# Patient Record
Sex: MALE | Race: BLACK OR AFRICAN AMERICAN | NOT HISPANIC OR LATINO
[De-identification: names, ages, dates, MRNs, and addresses within clinical notes are randomized per-mention and may not be internally consistent; named-entity substitution may affect disease eponyms.]

---

## 2022-04-02 ENCOUNTER — TRANSCRIPTION ENCOUNTER (OUTPATIENT)
Age: 37
End: 2022-04-02

## 2022-04-03 ENCOUNTER — INPATIENT (INPATIENT)
Facility: HOSPITAL | Age: 37
LOS: 0 days | Discharge: HOME | DRG: 909 | End: 2022-04-03
Attending: UROLOGY | Admitting: UROLOGY
Payer: COMMERCIAL

## 2022-04-03 ENCOUNTER — TRANSCRIPTION ENCOUNTER (OUTPATIENT)
Age: 37
End: 2022-04-03

## 2022-04-03 VITALS
OXYGEN SATURATION: 100 % | DIASTOLIC BLOOD PRESSURE: 110 MMHG | HEART RATE: 88 BPM | TEMPERATURE: 98 F | RESPIRATION RATE: 20 BRPM | HEIGHT: 69 IN | SYSTOLIC BLOOD PRESSURE: 156 MMHG | WEIGHT: 158.07 LBS

## 2022-04-03 VITALS
OXYGEN SATURATION: 100 % | DIASTOLIC BLOOD PRESSURE: 87 MMHG | SYSTOLIC BLOOD PRESSURE: 127 MMHG | RESPIRATION RATE: 14 BRPM | HEART RATE: 74 BPM

## 2022-04-03 DIAGNOSIS — S39.840A FRACTURE OF CORPUS CAVERNOSUM PENIS, INITIAL ENCOUNTER: ICD-10-CM

## 2022-04-03 LAB
ALBUMIN SERPL ELPH-MCNC: 5.3 G/DL — HIGH (ref 3.3–5)
ALP SERPL-CCNC: 60 U/L — SIGNIFICANT CHANGE UP (ref 40–120)
ALT FLD-CCNC: 65 U/L — HIGH (ref 10–45)
ANION GAP SERPL CALC-SCNC: 14 MMOL/L — SIGNIFICANT CHANGE UP (ref 5–17)
APPEARANCE UR: CLEAR — SIGNIFICANT CHANGE UP
APTT BLD: 29.1 SEC — SIGNIFICANT CHANGE UP (ref 27.5–35.5)
AST SERPL-CCNC: 31 U/L — SIGNIFICANT CHANGE UP (ref 10–40)
BACTERIA # UR AUTO: NEGATIVE — SIGNIFICANT CHANGE UP
BASOPHILS # BLD AUTO: 0.03 K/UL — SIGNIFICANT CHANGE UP (ref 0–0.2)
BASOPHILS NFR BLD AUTO: 0.3 % — SIGNIFICANT CHANGE UP (ref 0–2)
BILIRUB SERPL-MCNC: 0.8 MG/DL — SIGNIFICANT CHANGE UP (ref 0.2–1.2)
BILIRUB UR-MCNC: NEGATIVE — SIGNIFICANT CHANGE UP
BLD GP AB SCN SERPL QL: NEGATIVE — SIGNIFICANT CHANGE UP
BUN SERPL-MCNC: 14 MG/DL — SIGNIFICANT CHANGE UP (ref 7–23)
CALCIUM SERPL-MCNC: 10.4 MG/DL — SIGNIFICANT CHANGE UP (ref 8.4–10.5)
CHLORIDE SERPL-SCNC: 102 MMOL/L — SIGNIFICANT CHANGE UP (ref 96–108)
CO2 SERPL-SCNC: 26 MMOL/L — SIGNIFICANT CHANGE UP (ref 22–31)
COLOR SPEC: COLORLESS — SIGNIFICANT CHANGE UP
CREAT SERPL-MCNC: 1.2 MG/DL — SIGNIFICANT CHANGE UP (ref 0.5–1.3)
DIFF PNL FLD: NEGATIVE — SIGNIFICANT CHANGE UP
EGFR: 80 ML/MIN/1.73M2 — SIGNIFICANT CHANGE UP
EOSINOPHIL # BLD AUTO: 0.07 K/UL — SIGNIFICANT CHANGE UP (ref 0–0.5)
EOSINOPHIL NFR BLD AUTO: 0.8 % — SIGNIFICANT CHANGE UP (ref 0–6)
EPI CELLS # UR: 0 /HPF — SIGNIFICANT CHANGE UP
GLUCOSE SERPL-MCNC: 96 MG/DL — SIGNIFICANT CHANGE UP (ref 70–99)
GLUCOSE UR QL: NEGATIVE — SIGNIFICANT CHANGE UP
HCT VFR BLD CALC: 45.9 % — SIGNIFICANT CHANGE UP (ref 39–50)
HGB BLD-MCNC: 16.2 G/DL — SIGNIFICANT CHANGE UP (ref 13–17)
HYALINE CASTS # UR AUTO: 1 /LPF — SIGNIFICANT CHANGE UP (ref 0–2)
IMM GRANULOCYTES NFR BLD AUTO: 0.3 % — SIGNIFICANT CHANGE UP (ref 0–1.5)
INR BLD: 1 RATIO — SIGNIFICANT CHANGE UP (ref 0.88–1.16)
KETONES UR-MCNC: NEGATIVE — SIGNIFICANT CHANGE UP
LEUKOCYTE ESTERASE UR-ACNC: NEGATIVE — SIGNIFICANT CHANGE UP
LYMPHOCYTES # BLD AUTO: 1.67 K/UL — SIGNIFICANT CHANGE UP (ref 1–3.3)
LYMPHOCYTES # BLD AUTO: 18.6 % — SIGNIFICANT CHANGE UP (ref 13–44)
MCHC RBC-ENTMCNC: 30.7 PG — SIGNIFICANT CHANGE UP (ref 27–34)
MCHC RBC-ENTMCNC: 35.3 GM/DL — SIGNIFICANT CHANGE UP (ref 32–36)
MCV RBC AUTO: 87.1 FL — SIGNIFICANT CHANGE UP (ref 80–100)
MONOCYTES # BLD AUTO: 0.57 K/UL — SIGNIFICANT CHANGE UP (ref 0–0.9)
MONOCYTES NFR BLD AUTO: 6.3 % — SIGNIFICANT CHANGE UP (ref 2–14)
NEUTROPHILS # BLD AUTO: 6.62 K/UL — SIGNIFICANT CHANGE UP (ref 1.8–7.4)
NEUTROPHILS NFR BLD AUTO: 73.7 % — SIGNIFICANT CHANGE UP (ref 43–77)
NITRITE UR-MCNC: NEGATIVE — SIGNIFICANT CHANGE UP
NRBC # BLD: 0 /100 WBCS — SIGNIFICANT CHANGE UP (ref 0–0)
PH UR: 6 — SIGNIFICANT CHANGE UP (ref 5–8)
PLATELET # BLD AUTO: 300 K/UL — SIGNIFICANT CHANGE UP (ref 150–400)
POTASSIUM SERPL-MCNC: 4.5 MMOL/L — SIGNIFICANT CHANGE UP (ref 3.5–5.3)
POTASSIUM SERPL-SCNC: 4.5 MMOL/L — SIGNIFICANT CHANGE UP (ref 3.5–5.3)
PROT SERPL-MCNC: 8.2 G/DL — SIGNIFICANT CHANGE UP (ref 6–8.3)
PROT UR-MCNC: NEGATIVE — SIGNIFICANT CHANGE UP
PROTHROM AB SERPL-ACNC: 11.6 SEC — SIGNIFICANT CHANGE UP (ref 10.5–13.4)
RBC # BLD: 5.27 M/UL — SIGNIFICANT CHANGE UP (ref 4.2–5.8)
RBC # FLD: 12.3 % — SIGNIFICANT CHANGE UP (ref 10.3–14.5)
RBC CASTS # UR COMP ASSIST: 1 /HPF — SIGNIFICANT CHANGE UP (ref 0–4)
RH IG SCN BLD-IMP: POSITIVE — SIGNIFICANT CHANGE UP
RH IG SCN BLD-IMP: POSITIVE — SIGNIFICANT CHANGE UP
SARS-COV-2 RNA SPEC QL NAA+PROBE: SIGNIFICANT CHANGE UP
SODIUM SERPL-SCNC: 142 MMOL/L — SIGNIFICANT CHANGE UP (ref 135–145)
SP GR SPEC: 1.01 — SIGNIFICANT CHANGE UP (ref 1.01–1.02)
UROBILINOGEN FLD QL: NEGATIVE — SIGNIFICANT CHANGE UP
WBC # BLD: 8.99 K/UL — SIGNIFICANT CHANGE UP (ref 3.8–10.5)
WBC # FLD AUTO: 8.99 K/UL — SIGNIFICANT CHANGE UP (ref 3.8–10.5)
WBC UR QL: 1 /HPF — SIGNIFICANT CHANGE UP (ref 0–5)

## 2022-04-03 PROCEDURE — 86900 BLOOD TYPING SEROLOGIC ABO: CPT

## 2022-04-03 PROCEDURE — 99284 EMERGENCY DEPT VISIT MOD MDM: CPT

## 2022-04-03 PROCEDURE — 85025 COMPLETE CBC W/AUTO DIFF WBC: CPT

## 2022-04-03 PROCEDURE — 99222 1ST HOSP IP/OBS MODERATE 55: CPT | Mod: 57,25

## 2022-04-03 PROCEDURE — U0003: CPT

## 2022-04-03 PROCEDURE — 54440 REPAIR OF PENIS: CPT

## 2022-04-03 PROCEDURE — 85610 PROTHROMBIN TIME: CPT

## 2022-04-03 PROCEDURE — 85730 THROMBOPLASTIN TIME PARTIAL: CPT

## 2022-04-03 PROCEDURE — 81001 URINALYSIS AUTO W/SCOPE: CPT

## 2022-04-03 PROCEDURE — 87086 URINE CULTURE/COLONY COUNT: CPT

## 2022-04-03 PROCEDURE — 86901 BLOOD TYPING SEROLOGIC RH(D): CPT

## 2022-04-03 PROCEDURE — 99285 EMERGENCY DEPT VISIT HI MDM: CPT | Mod: 25

## 2022-04-03 PROCEDURE — C9399: CPT

## 2022-04-03 PROCEDURE — 86850 RBC ANTIBODY SCREEN: CPT

## 2022-04-03 PROCEDURE — 36415 COLL VENOUS BLD VENIPUNCTURE: CPT

## 2022-04-03 PROCEDURE — 80053 COMPREHEN METABOLIC PANEL: CPT

## 2022-04-03 PROCEDURE — 52000 CYSTOURETHROSCOPY: CPT

## 2022-04-03 RX ORDER — FENTANYL CITRATE 50 UG/ML
50 INJECTION INTRAVENOUS
Refills: 0 | Status: DISCONTINUED | OUTPATIENT
Start: 2022-04-03 | End: 2022-04-03

## 2022-04-03 RX ORDER — ONDANSETRON 8 MG/1
4 TABLET, FILM COATED ORAL ONCE
Refills: 0 | Status: DISCONTINUED | OUTPATIENT
Start: 2022-04-03 | End: 2022-04-03

## 2022-04-03 RX ORDER — IBUPROFEN 200 MG
1 TABLET ORAL
Qty: 18 | Refills: 0
Start: 2022-04-03 | End: 2022-04-08

## 2022-04-03 RX ORDER — ACETAMINOPHEN 500 MG
650 TABLET ORAL EVERY 6 HOURS
Refills: 0 | Status: DISCONTINUED | OUTPATIENT
Start: 2022-04-03 | End: 2022-04-03

## 2022-04-03 RX ORDER — HYDROMORPHONE HYDROCHLORIDE 2 MG/ML
0.5 INJECTION INTRAMUSCULAR; INTRAVENOUS; SUBCUTANEOUS
Refills: 0 | Status: DISCONTINUED | OUTPATIENT
Start: 2022-04-03 | End: 2022-04-03

## 2022-04-03 RX ORDER — ONDANSETRON 8 MG/1
4 TABLET, FILM COATED ORAL EVERY 6 HOURS
Refills: 0 | Status: DISCONTINUED | OUTPATIENT
Start: 2022-04-03 | End: 2022-04-03

## 2022-04-03 NOTE — ED PROVIDER NOTE - ATTENDING CONTRIBUTION TO CARE
Attending MD Vigil:   I personally have seen and examined this patient.  Physician assistant note reviewed and agree on plan of care and except where noted.  See below for details.     Seen in [facility], accompanied by significant other    36M with no reported PMH/PSH/Meds, no known drug allergies presents to the ED with bruising and swelling to the penis.  Repots was having sexual intercourse with female partner who is bedside at around 1am when he felt a "crack".  Reports that he occasionally feels a "crack" during intercourse but typically feels good.  Reports that this felt very different and did not feel good.  [place] seek immediate medical attention.  Reports that he noted bruising and swelling to the penis.  Denies bleeding, inability to urinate, difficulty urinating, hematuria, dysuria.  Reports that he is uncircumcised and is unable to fully retract the foreskin which he can usually do.  Denies testicular pain or scrotal swelling.  Denies abdominal pain, nausea, vomiting, diarrhea, chest pain, shortness of breath, fevers, chills.  Denies previous trauma.  A ten (10) point review of systems was negative other than as stated in the HPI or elsewhere in the chart.     Exam:   General: NAD  HENT: head NCAT, airway patent   Eyes: PERRL  Lungs: lungs CTAB with good inspiratory effort, no wheezing, no rhonchi, no rales  Cardiac: +S1S2, no m/r/g  GI: abdomen soft with +BS, NT, ND  : chaperoned by ROLANDO Taylor and DEE Lion, no testicular tenderness to palpation, no scrotal edema, no blood at meatus, able to retract foreskin enough to see meatus, ecchymosis and edema to shaft of flaccid penis  MSK: ranging neck and extremities freely  Neuro: moving all extremities spontaneously, sensory grossly intact, no gross neuro deficits  Psych: normal mood and affect     A/P: 36M with penile edema and ecchymosis, concern for penile fracture, will consult uro, patient wishes to defer labs until urology makes operative decision

## 2022-04-03 NOTE — ED ADULT NURSE REASSESSMENT NOTE - NS ED NURSE REASSESS COMMENT FT1
Report received from Ayad RN at 14:35. Pt being seen by urology at this time, for possible surgery. VSS.

## 2022-04-03 NOTE — H&P ADULT - ATTENDING COMMENTS
Pt seen/examined.  Case discussed with housestaff/PA team.  Agree with above note history, physical and assessment/plan.  Penile fracture based on clinical hx/exam  Plan for OR for exploration, repair of penile fracture

## 2022-04-03 NOTE — H&P ADULT - NSHPPOACENTRALVENOUSCATHETER_GEN_ALL_CORE
no

## 2022-04-03 NOTE — ASU DISCHARGE PLAN (ADULT/PEDIATRIC) - CARE PROVIDER_API CALL
Fredo Lopez)  Urology  [address] [place] [address], [place] [address]  Phone: [phone]  Fax: [phone]  Follow Up Time: 2 weeks

## 2022-04-03 NOTE — ED PROVIDER NOTE - NS ED ATTENDING STATEMENT MOD
This was a shared visit with the CAMRYN. I reviewed and verified the documentation and independently performed the documented:

## 2022-04-03 NOTE — ED PROVIDER NOTE - OBJECTIVE STATEMENT
37 yo male with no sig medical hx here for penile swelling and discoloration x13 hours s/p sexual intercourse. Pt states he felt a "pop" while he was having sexual intercourse and afterwards felt pain and noticed swelling. This morning when he awoke he noticed pain has improved  however swelling has worsened. Pt has been urinating normally, denies hematuria or present pain. Not on AC, no NSAID use.

## 2022-04-03 NOTE — ED ADULT NURSE NOTE - OBJECTIVE STATEMENT
Patient came in c/o swollen penis after sexual intercourse last night. No distress. Breathing easy and non labored. Denies any pain at this time. Pt able to urinate. No hematuria

## 2022-04-03 NOTE — ASU DISCHARGE PLAN (ADULT/PEDIATRIC) - ASU DC SPECIAL INSTRUCTIONSFT
WOUND CARE: You may remove the dressing in 48 hours. After this time you can shower (no baths or hot tubs)  BATHING: Please do not submerge wound underwater. You may shower and/or sponge bathe.  ACTIVITY: No heavy lifting or straining. Otherwise, you may return to your usual level of physical activity. No sexual activity for 4 weeks.   PAIN: You may take Tylenol and ibuprofen around the clock for pain.  You can alternate between the Tylenol and ibuprofen so that you are taking something for pain every 3 hours. Be sure to not exceed the maximum dosage for these medications as listed on the label. You may take the extra strength ibuprofen instead of the regular dose for more severe pain.  DIET: Return to your usual diet.  NOTIFY YOUR SURGEON IF: You have any bleeding that does not stop, any pus draining from your wound, any fever (over 100.4 F) or chills, persistent nausea/vomiting, persistent diarrhea, or if your pain is not controlled on your discharge pain medications.  FOLLOW-UP:  1. Please call to make a follow-up appointment. Plan to see Dr Lopez in 2-3 weeks.

## 2022-04-03 NOTE — PRE-ANESTHESIA EVALUATION ADULT - NSANTHTOBACCOSD_GEN_ALL_CORE
used to smoke 1ppd for several years, switched from cigarettes to vaping 4 years ago, currently vapes/Yes

## 2022-04-03 NOTE — DISCHARGE NOTE NURSING/CASE MANAGEMENT/SOCIAL WORK - NSDCPEFALRISK_GEN_ALL_CORE
For information on Fall & Injury Prevention, visit: [email or web address]/[email or web address] OR  [email or web address]/[email or web address] OR  https://www.cdc.gov/steadi/patient.html

## 2022-04-03 NOTE — PRE-ANESTHESIA EVALUATION ADULT - NSANTHPEFT_GEN_ALL_CORE
GEN: uncomfortable  NEURO: grossly intact  RESP: CTAB  CVS: S1S2 GEN: NAD, uncomfortable  NEURO: grossly intact  RESP: CTAB  CVS: S1S2

## 2022-04-03 NOTE — H&P ADULT - ASSESSMENT
37 yo male with no sig medical hx here for penile swelling and discoloration x13 hours s/p sexual intercourse. High concern for penile fracture given physical exam findings and history.     admit gu  added on to OR for penile exploration, repair of penile fracture  npo  preop labs

## 2022-04-03 NOTE — ASU DISCHARGE PLAN (ADULT/PEDIATRIC) - NS MD DC FALL RISK RISK
For information on Fall & Injury Prevention, visit: [email or web address] OR  [email or web address] OR  https://www.cdc.gov/steadi/patient.html

## 2022-04-03 NOTE — H&P ADULT - NSHPPHYSICALEXAM_GEN_ALL_CORE
Vital Signs Last 24 Hrs  T(C): 36.8 (03 Apr 2022 14:45), Max: 36.8 (03 Apr 2022 13:51)  T(F): 98.3 (03 Apr 2022 14:45), Max: 98.3 (03 Apr 2022 14:45)  HR: 84 (03 Apr 2022 14:45) (84 - 88)  BP: 133/80 (03 Apr 2022 14:45) (133/80 - 156/110)  RR: 20 (03 Apr 2022 14:45) (20 - 20)  SpO2: 100% (03 Apr 2022 14:45) (100% - 100%)    General: NAD, Anxious.   Neuro: A+Ox3, no focal deficits  Resp: No respiratory distress or accessory muscle use. no supplemental O2  Abd: Soft, NT/ND, no rebound/guarding  : uncircumcised penis with significant edema of penile shaft and foreskin. Able to retract partially to visualize meatus. no blood or discharge at meatus. Penile shaft ecchymotic. mild TTP of penile shaft. palpable hematoma on L shaft. testes descended b/l and nontender. no scrotal skin changes.

## 2022-04-03 NOTE — PRE-ANESTHESIA EVALUATION ADULT - NSANTHPMHFT_GEN_ALL_CORE
35 yo male with no sig medical hx here for penile swelling and discoloration x13 hours s/p sexual intercourse. Pt states he felt a "pop" while he was having sexual intercourse and immediately afterwards felt pain and noticed swelling of the penile shaft. Pt states he was behind partner when trying to penetrate, but missed and felt a snap. Pt also with bruising and discoloration of the penis. Applied a warm compress last night and attempted to rest. This morning when he awoke he noticed pain has improved  however swelling has worsened. Pt has been urinating normally, denies hematuria or present pain. 35 yo male with no sig medical hx here for penile swelling and discoloration x13 hours s/p sexual intercourse. Pt states he felt a "pop" while he was having sexual intercourse and immediately afterwards felt pain and noticed swelling of the penile shaft. Pt states he was behind partner when trying to penetrate, but missed and felt a snap. Pt also with bruising and discoloration of the penis. Applied a warm compress last night and attempted to rest. This morning when he awoke he noticed pain has improved  however swelling has worsened. Pt has been urinating normally, denies hematuria or present pain.    Pt reports that he had "a fast and irregular heartbeat" when he was a child. Says he regularly "went to checkups to keep an eye on it" but denies taking any medication for this. Says he has not seen a doctor for it as an adult. Says that he thinks he grew out of it. Endorses good functional status.    Ate crackers and cheese 2-3 hours prior.    Former smoker, now vapes.    Reports having had jaw surgery, and later had the jaw hardware explanted, but says it does not affect his mouth opening

## 2022-04-03 NOTE — ED PROVIDER NOTE - PHYSICAL EXAMINATION
A&Ox3, NAD, well appearing  NCAT. PERRL, EOMI.   Abd soft, NT/ND, +BS  : performed by Dr. Vigil with ROLANDO Taylor: uncircumcised, shaft ecchymotic and edematous, able to partially retract foreskin to visualize meatus however unable to fully retract. Testes soft, mobile and nontender.  No focal Defecits

## 2022-04-03 NOTE — H&P ADULT - HISTORY OF PRESENT ILLNESS
37 yo male with no sig medical hx here for penile swelling and discoloration x13 hours s/p sexual intercourse. Pt states he felt a "pop" while he was having sexual intercourse and immediately afterwards felt pain and noticed swelling of the penile shaft. Pt states he was behind partner when trying to penetrate, but missed and felt a snap. Pt also with bruising and discoloration of the penis. Applied a warm compress last night and attempted to rest.This morning when he awoke he noticed pain has improved  however swelling has worsened. Pt has been urinating normally, denies hematuria or present pain. Not on AC, no NSAID use.

## 2022-04-03 NOTE — ED PROVIDER NOTE - NS ED ROS FT
Constitutional: No fever or chills  GI: No abd pain. No nausea or vomiting. No diarrhea. No constipation.   : see hpi  MSK: No musculoskeletal pain  skin: see hpi

## 2022-04-04 ENCOUNTER — NON-APPOINTMENT (OUTPATIENT)
Age: 37
End: 2022-04-04

## 2022-04-04 PROBLEM — Z00.00 ENCOUNTER FOR PREVENTIVE HEALTH EXAMINATION: Status: ACTIVE | Noted: 2022-04-04

## 2022-04-04 LAB
CULTURE RESULTS: NO GROWTH — SIGNIFICANT CHANGE UP
SPECIMEN SOURCE: SIGNIFICANT CHANGE UP

## 2022-04-05 ENCOUNTER — NON-APPOINTMENT (OUTPATIENT)
Age: 37
End: 2022-04-05

## 2022-04-19 ENCOUNTER — APPOINTMENT (OUTPATIENT)
Dept: UROLOGY | Facility: CLINIC | Age: 37
End: 2022-04-19
Payer: COMMERCIAL

## 2022-04-19 PROCEDURE — 99024 POSTOP FOLLOW-UP VISIT: CPT

## 2022-04-19 NOTE — PHYSICAL EXAM
[General Appearance - Well Developed] : well developed [General Appearance - Well Nourished] : well nourished [Well Groomed] : well groomed [Normal Appearance] : normal appearance [General Appearance - In No Acute Distress] : no acute distress [Urethral Meatus] : meatus normal [FreeTextEntry1] : tiny few mm wound separation at penile incision, improved swelling of penis

## 2022-04-19 NOTE — REVIEW OF SYSTEMS
[Negative] : Heme/Lymph [see HPI] : see HPI [Poor quality erections] : Poor quality erections [Seen by urologist before (Name)  ___] : Preciously seen by a urologist: [unfilled]

## 2022-04-19 NOTE — HISTORY OF PRESENT ILLNESS
[FreeTextEntry1] : The patient is a 36-year-old man who presents for f/u after penile fracture repair.\par \par He was seen in ER 4/3/22, approximately 11 hours status post an incident where he was having sexual activity and missed  penetration.  He felt an immediate pop sound and sensation with immediate detumescence.  Subsequently, he noted significant circumferential diffuse penile swelling.  Of physical exam, he was noted to have what appeared to be a palpable defect along the  right lateral aspect of his corpora along the base of his penis.  \par \par Penile fracture noted and repaired.  Postop he has had partial erections daily in AM.  He feels his penile length with erections is less.\par

## 2022-04-19 NOTE — ASSESSMENT
[FreeTextEntry1] : Penile fracture s/p repair\par \par Postop healing well\par D/w pt that may take 2-3 months to fully heal and to avoid sexual activity\par No sexual activity at this time\par F/u 3 mos

## 2022-06-28 ENCOUNTER — APPOINTMENT (OUTPATIENT)
Dept: UROLOGY | Facility: CLINIC | Age: 37
End: 2022-06-28
Payer: COMMERCIAL

## 2022-06-28 VITALS — DIASTOLIC BLOOD PRESSURE: 92 MMHG | SYSTOLIC BLOOD PRESSURE: 134 MMHG | HEART RATE: 89 BPM

## 2022-06-28 DIAGNOSIS — S39.840D FRACTURE OF CORPUS CAVERNOSUM PENIS, SUBSEQUENT ENCOUNTER: ICD-10-CM

## 2022-06-28 PROCEDURE — 99024 POSTOP FOLLOW-UP VISIT: CPT

## 2022-06-28 NOTE — HISTORY OF PRESENT ILLNESS
[FreeTextEntry1] : The patient is a 36-year-old man who presents for f/u after penile fracture repair.\par \par Prior hx:\par He was seen in ER 4/3/22, approximately 11 hours status post an incident where he was having sexual activity and missed  penetration.  He felt an immediate pop sound and sensation with immediate detumescence.  Subsequently, he noted significant circumferential diffuse penile swelling.  Of physical exam, he was noted to have what appeared to be a palpable defect along the  right lateral aspect of his corpora along the base of his penis.  \par \par Penile fracture noted and repaired.  Postop he has had partial erections daily in AM.  He feels his penile length with erections is less.\par \par Interval hx:\par He reports starting to have sex a few weeks ago.  He has had sex a few times - no erectile problems.  Reports firm erections and straight penis.  He feels it is not 100% the same, more like 70% in his opinion.  However he states that 2 nights ago he had more rough sex multiples times over 3-4 hrs, he can last for >30 minutes.  This was similar to his prior incident.  After this he reports some penile soreness.\par \par No urinary symptoms.\par \par

## 2022-06-28 NOTE — PHYSICAL EXAM
[General Appearance - Well Developed] : well developed [General Appearance - Well Nourished] : well nourished [Normal Appearance] : normal appearance [Well Groomed] : well groomed [General Appearance - In No Acute Distress] : no acute distress [FreeTextEntry1] : mild ttp over prior repair, there is some palpable firmness from repair. No evidence of infection or trauma.  Skin is well healed.

## 2022-06-28 NOTE — ASSESSMENT
[FreeTextEntry1] : Patient is a 37 yo M who presents for penile fracture s/p repair.\par \par Overall doing well\par D/w pt that he will likely require further healing and should avoid rough sex\par F/u 6 mos

## 2022-12-20 ENCOUNTER — APPOINTMENT (OUTPATIENT)
Dept: UROLOGY | Facility: CLINIC | Age: 37
End: 2022-12-20

## 2025-06-06 ENCOUNTER — EMERGENCY (EMERGENCY)
Facility: HOSPITAL | Age: 40
LOS: 1 days | End: 2025-06-06
Attending: STUDENT IN AN ORGANIZED HEALTH CARE EDUCATION/TRAINING PROGRAM
Payer: COMMERCIAL

## 2025-06-06 VITALS
HEIGHT: 69 IN | SYSTOLIC BLOOD PRESSURE: 124 MMHG | OXYGEN SATURATION: 99 % | RESPIRATION RATE: 18 BRPM | DIASTOLIC BLOOD PRESSURE: 88 MMHG | HEART RATE: 95 BPM | WEIGHT: 158.07 LBS | TEMPERATURE: 99 F

## 2025-06-06 PROCEDURE — 99285 EMERGENCY DEPT VISIT HI MDM: CPT

## 2025-06-06 RX ORDER — ACETAMINOPHEN 500 MG/5ML
1000 LIQUID (ML) ORAL ONCE
Refills: 0 | Status: COMPLETED | OUTPATIENT
Start: 2025-06-06 | End: 2025-06-06

## 2025-06-06 RX ADMIN — Medication 1000 MILLILITER(S): at 23:58

## 2025-06-06 RX ADMIN — Medication 400 MILLIGRAM(S): at 23:58

## 2025-06-07 VITALS
RESPIRATION RATE: 18 BRPM | OXYGEN SATURATION: 97 % | HEART RATE: 81 BPM | SYSTOLIC BLOOD PRESSURE: 129 MMHG | DIASTOLIC BLOOD PRESSURE: 91 MMHG | TEMPERATURE: 98 F

## 2025-06-07 LAB
ADD ON TEST-SPECIMEN IN LAB: SIGNIFICANT CHANGE UP
ALBUMIN SERPL ELPH-MCNC: 4.9 G/DL — SIGNIFICANT CHANGE UP (ref 3.3–5)
ALP SERPL-CCNC: 66 U/L — SIGNIFICANT CHANGE UP (ref 40–120)
ALT FLD-CCNC: 54 U/L — HIGH (ref 10–45)
ANION GAP SERPL CALC-SCNC: 18 MMOL/L — HIGH (ref 5–17)
APPEARANCE UR: CLEAR — SIGNIFICANT CHANGE UP
AST SERPL-CCNC: 33 U/L — SIGNIFICANT CHANGE UP (ref 10–40)
BASOPHILS # BLD AUTO: 0.05 K/UL — SIGNIFICANT CHANGE UP (ref 0–0.2)
BASOPHILS NFR BLD AUTO: 0.7 % — SIGNIFICANT CHANGE UP (ref 0–2)
BILIRUB SERPL-MCNC: 0.3 MG/DL — SIGNIFICANT CHANGE UP (ref 0.2–1.2)
BILIRUB UR-MCNC: NEGATIVE — SIGNIFICANT CHANGE UP
BUN SERPL-MCNC: 12 MG/DL — SIGNIFICANT CHANGE UP (ref 7–23)
CALCIUM SERPL-MCNC: 10.4 MG/DL — SIGNIFICANT CHANGE UP (ref 8.4–10.5)
CHLORIDE SERPL-SCNC: 100 MMOL/L — SIGNIFICANT CHANGE UP (ref 96–108)
CO2 SERPL-SCNC: 21 MMOL/L — LOW (ref 22–31)
COLOR SPEC: YELLOW — SIGNIFICANT CHANGE UP
CREAT SERPL-MCNC: 0.97 MG/DL — SIGNIFICANT CHANGE UP (ref 0.5–1.3)
DIFF PNL FLD: NEGATIVE — SIGNIFICANT CHANGE UP
EGFR: 102 ML/MIN/1.73M2 — SIGNIFICANT CHANGE UP
EGFR: 102 ML/MIN/1.73M2 — SIGNIFICANT CHANGE UP
EOSINOPHIL # BLD AUTO: 0.21 K/UL — SIGNIFICANT CHANGE UP (ref 0–0.5)
EOSINOPHIL NFR BLD AUTO: 2.8 % — SIGNIFICANT CHANGE UP (ref 0–6)
GLUCOSE SERPL-MCNC: 89 MG/DL — SIGNIFICANT CHANGE UP (ref 70–99)
GLUCOSE UR QL: NEGATIVE MG/DL — SIGNIFICANT CHANGE UP
HCT VFR BLD CALC: 43.4 % — SIGNIFICANT CHANGE UP (ref 39–50)
HGB BLD-MCNC: 15.4 G/DL — SIGNIFICANT CHANGE UP (ref 13–17)
IMM GRANULOCYTES NFR BLD AUTO: 0.1 % — SIGNIFICANT CHANGE UP (ref 0–0.9)
KETONES UR QL: ABNORMAL MG/DL
LEUKOCYTE ESTERASE UR-ACNC: NEGATIVE — SIGNIFICANT CHANGE UP
LIDOCAIN IGE QN: 42 U/L — SIGNIFICANT CHANGE UP (ref 7–60)
LYMPHOCYTES # BLD AUTO: 2.43 K/UL — SIGNIFICANT CHANGE UP (ref 1–3.3)
LYMPHOCYTES # BLD AUTO: 32.7 % — SIGNIFICANT CHANGE UP (ref 13–44)
MCHC RBC-ENTMCNC: 31 PG — SIGNIFICANT CHANGE UP (ref 27–34)
MCHC RBC-ENTMCNC: 35.5 G/DL — SIGNIFICANT CHANGE UP (ref 32–36)
MCV RBC AUTO: 87.5 FL — SIGNIFICANT CHANGE UP (ref 80–100)
MONOCYTES # BLD AUTO: 0.51 K/UL — SIGNIFICANT CHANGE UP (ref 0–0.9)
MONOCYTES NFR BLD AUTO: 6.9 % — SIGNIFICANT CHANGE UP (ref 2–14)
NEUTROPHILS # BLD AUTO: 4.23 K/UL — SIGNIFICANT CHANGE UP (ref 1.8–7.4)
NEUTROPHILS NFR BLD AUTO: 56.8 % — SIGNIFICANT CHANGE UP (ref 43–77)
NITRITE UR-MCNC: NEGATIVE — SIGNIFICANT CHANGE UP
NRBC BLD AUTO-RTO: 0 /100 WBCS — SIGNIFICANT CHANGE UP (ref 0–0)
PH UR: 5.5 — SIGNIFICANT CHANGE UP (ref 5–8)
PLATELET # BLD AUTO: 361 K/UL — SIGNIFICANT CHANGE UP (ref 150–400)
POTASSIUM SERPL-MCNC: 4.3 MMOL/L — SIGNIFICANT CHANGE UP (ref 3.5–5.3)
POTASSIUM SERPL-SCNC: 4.3 MMOL/L — SIGNIFICANT CHANGE UP (ref 3.5–5.3)
PROT SERPL-MCNC: 8.1 G/DL — SIGNIFICANT CHANGE UP (ref 6–8.3)
PROT UR-MCNC: NEGATIVE MG/DL — SIGNIFICANT CHANGE UP
RBC # BLD: 4.96 M/UL — SIGNIFICANT CHANGE UP (ref 4.2–5.8)
RBC # FLD: 12.2 % — SIGNIFICANT CHANGE UP (ref 10.3–14.5)
SODIUM SERPL-SCNC: 139 MMOL/L — SIGNIFICANT CHANGE UP (ref 135–145)
SP GR SPEC: >1.03 — HIGH (ref 1–1.03)
UROBILINOGEN FLD QL: 0.2 MG/DL — SIGNIFICANT CHANGE UP (ref 0.2–1)
WBC # BLD: 7.44 K/UL — SIGNIFICANT CHANGE UP (ref 3.8–10.5)
WBC # FLD AUTO: 7.44 K/UL — SIGNIFICANT CHANGE UP (ref 3.8–10.5)

## 2025-06-07 PROCEDURE — 83690 ASSAY OF LIPASE: CPT

## 2025-06-07 PROCEDURE — 74177 CT ABD & PELVIS W/CONTRAST: CPT | Mod: 26

## 2025-06-07 PROCEDURE — 74177 CT ABD & PELVIS W/CONTRAST: CPT

## 2025-06-07 PROCEDURE — 99284 EMERGENCY DEPT VISIT MOD MDM: CPT | Mod: 25

## 2025-06-07 PROCEDURE — 80053 COMPREHEN METABOLIC PANEL: CPT

## 2025-06-07 PROCEDURE — 85025 COMPLETE CBC W/AUTO DIFF WBC: CPT

## 2025-06-07 PROCEDURE — 83735 ASSAY OF MAGNESIUM: CPT

## 2025-06-07 PROCEDURE — 96374 THER/PROPH/DIAG INJ IV PUSH: CPT | Mod: XU

## 2025-06-07 PROCEDURE — 81003 URINALYSIS AUTO W/O SCOPE: CPT

## 2025-06-07 NOTE — ED PROVIDER NOTE - NSFOLLOWUPCLINICS_GEN_ALL_ED_FT
[facility]  Gastroenterology  [address] [place] [address] [address]  [place] [address]  Phone: [phone]  Fax:

## 2025-06-07 NOTE — ED PROVIDER NOTE - CLINICAL SUMMARY MEDICAL DECISION MAKING FREE TEXT BOX
39-year-old male with no significant past medical history presents emergency department from urgent care to evaluate for possible appendicitis.  Patient states over the past few days has been experiencing left flank pain. Patient is well appearing, non toxic and in no acute distress. With mild RLQ tenderness on exam. Differential includes kidney stone vs appendicitis vs gas pain vs pulled muscle vs hernia. Will check labs, obtain CT imaging as patient was sent in for CT scan, but likely d/c with PCP f/u after symptomatic improvement.

## 2025-06-07 NOTE — ED PROVIDER NOTE - MDM ORDERS SUBMITTED SELECTION
Goal Outcome Evaluation:           Progress: no change     A/o x 4. Multiple c/o pain. (See MAR) Accucheck, correction dose given. Tolerating diet. Up ad tab. Voiding. IVF/ ABX infusing per orders. Finger dressing C/D/I. VSS. Safety maintained.                             Imaging Studies Imaging Studies/Medications

## 2025-06-07 NOTE — ED PROVIDER NOTE - ATTENDING CONTRIBUTION TO CARE
Rojelio Jones MD (Attending Physician):    I performed a history and physical exam of the patient and discussed their management with the resident/fellow/ACP/student. I have reviewed the resident/fellow/ACP/student note and agree with the documented findings and plan of care, except as noted. I have personally performed a substantive portion of the visit including all aspects of the medical decision making. My medical decision making and observations are found below. Please refer to any progress notes for updates on clinical course.    HPI:  39-year-old male with no significant past medical history presents from urgent care to evaluate for possible appendicitis.  Patient states over the past few days has been experiencing left flank pain.  Today, developed pain in the right lower quadrant, that does not radiate.  Was seen in urgent care center, had UA done which was negative and was told to come to the emergency department for CT scan to evaluate for appendicitis.  Denies fevers or chills, chest pain or shortness of breath, history of kidney stones, testicular pain, urinary symptoms, vomiting, diarrhea or constipation.  No known drug allergies.  Did not take pain medications prior to arrival.    PE:  Vitals noted  GEN - +In mild discomfort, A&Ox3  HEAD - NC/AT  EYES - PERRL, EOMI  ENT - Airway patent, mucous membranes moist  PULMONARY - Normal wob, CTA b/l, symmetric breath sounds, no W/R/R, satting 98% on RA  CARDIAC - +S1S2, RRR, no M/G/R, no JVD  ABDOMEN - +BS, ND, +TTP in RLQ, soft, no guarding, no rebound, no masses, no rigidity   - No CVA TTP b/l. Penis wnl. B/l testicles soft, NT. Cremasteric reflex intact b/l.  EXTREMITIES - FROM, symmetric pulses, no edema  SKIN - No rash or bruising  NEUROLOGIC - Alert, speech clear, moving all extremities spontaneously, no focal deficits  PSYCH - Normal mood/affect, normal insight    MDM:  DDx includes, but not limited to: appendicitis, colitis, diverticulitis, kidney stone, UTI, muscle strain, hernia, pancreatitis. CT a/p, labs, tylenol, IVF. Dispo pending w/u.

## 2025-06-07 NOTE — ED PROVIDER NOTE - PROGRESS NOTE DETAILS
Rojelio Jones MD (Attending Physician): Pt passed PO challenge. Pt was re-evaluated at bedside, VSS, feeling better overall. Results were discussed with patient as well as return precautions and follow up plan with PCP and GI. Time was taken to answer any questions that the patient had before providing them with discharge paperwork.

## 2025-06-07 NOTE — ED PROVIDER NOTE - PATIENT PORTAL LINK FT
You can access the FollowMyHealth Patient Portal offered by [facility] by registering at the following website: [email or web address]. By joining [employer]’s FollowMyHealth portal, you will also be able to view your health information using other applications (apps) compatible with our system.

## 2025-06-07 NOTE — ED PROVIDER NOTE - PHYSICAL EXAMINATION
Gen: well appearing, in no acute distress   Head: normal appearing  HEENT: normal conjunctiva, oral mucosa moist, vision grossly intact   Lung: no respiratory distress, speaking in full sentences, CTA b/l, no wheeze, crackles or rhonchi   CV: regular rate and rhythm, no murmurs  Abd: soft, non distended, mild RLQ tenderness, no rebound or guarding, no palpable hernia   MSK: no visible deformities, ambulating without difficulty   Neuro: No focal deficits, AAOx3  Skin: Warm, no rashes   Psych: normal affect

## 2025-06-07 NOTE — ED ADULT NURSE NOTE - OBJECTIVE STATEMENT
39y M A&O x3 fiance bedside, sent in by [facility]. Presenting to the ER with pain to LLQ of back which has been going on for the past 2 days and 6/7 pain radiating to RLQ of abdomen. Pt as well has had lightheadedness and dizziness for the past 3 days. Endorses decreased PO intake but is taking in fluids. Pt went to [facility] and had UA done and was recommended to come in for further eval and r/o appendicis. Pt denies chest pain, sob, n/v/d, fevers, chills, HA, cough, congestion, numbness, tingling, pain to genitals, urinary symptoms.

## 2025-06-07 NOTE — ED PROVIDER NOTE - OBJECTIVE STATEMENT
39-year-old male with no significant past medical history presents emergency department from urgent care to evaluate for possible appendicitis.  Patient states over the past few days has been experiencing left flank pain.  Today, developed pain in the right lower quadrant, that does not radiate.  Was seen in urgent care center, had UA done which was negative and was told to come to the emergency department for CT scan to evaluate for appendicitis.  Denies fevers or chills, chest pain or shortness of breath, history of kidney stones, testicular pain, urinary symptoms, vomiting, diarrhea or constipation.  No known drug allergies.  Did not take medication prior to arrival for pain.
